# Patient Record
Sex: MALE | Race: WHITE | NOT HISPANIC OR LATINO | Employment: OTHER | URBAN - METROPOLITAN AREA
[De-identification: names, ages, dates, MRNs, and addresses within clinical notes are randomized per-mention and may not be internally consistent; named-entity substitution may affect disease eponyms.]

---

## 2020-09-10 ENCOUNTER — MISSION CATARACT COMP/SO'S (OUTPATIENT)
Dept: URBAN - METROPOLITAN AREA CLINIC 39 | Facility: CLINIC | Age: 65
End: 2020-09-10

## 2020-09-10 DIAGNOSIS — H25.811: ICD-10-CM

## 2020-09-10 PROCEDURE — 99499MCC MISSION CATARACT COMP EYE EXAM / SOS

## 2020-09-10 ASSESSMENT — VISUAL ACUITY
OS_SC: J1
OD_CC: CF 2FT
OS_CC: 20/25+2
OD_SC: >J12
OS_SC: 20/200
OD_SC: CF 1FT

## 2020-09-10 ASSESSMENT — TONOMETRY
OD_IOP_MMHG: 13
OS_IOP_MMHG: 11

## 2020-09-23 ENCOUNTER — MISSION CATARACT SURGERY/PROCEDURE (OUTPATIENT)
Dept: URBAN - METROPOLITAN AREA CLINIC 39 | Facility: CLINIC | Age: 65
End: 2020-09-23

## 2020-09-23 DIAGNOSIS — H25.811: ICD-10-CM

## 2020-09-23 PROCEDURE — 66984MC MISSION CATARACT SURGERY (ALL INCLUSIVE)

## 2020-09-24 ENCOUNTER — CATARACT POST-OP 1-DAY (OUTPATIENT)
Dept: URBAN - METROPOLITAN AREA CLINIC 39 | Facility: CLINIC | Age: 65
End: 2020-09-24

## 2020-09-24 DIAGNOSIS — Z96.1: ICD-10-CM

## 2020-09-24 PROCEDURE — 99024 POSTOP FOLLOW-UP VISIT: CPT

## 2020-09-24 ASSESSMENT — TONOMETRY
OS_IOP_MMHG: 15
OD_IOP_MMHG: 16

## 2020-09-24 ASSESSMENT — VISUAL ACUITY
OD_PH: 20/100
OS_SC: 20/80
OD_SC: 20/200

## 2020-10-26 ENCOUNTER — POST-OP CATARACT (OUTPATIENT)
Dept: URBAN - METROPOLITAN AREA CLINIC 39 | Facility: CLINIC | Age: 65
End: 2020-10-26

## 2020-10-26 DIAGNOSIS — Z96.1: ICD-10-CM

## 2020-10-26 PROCEDURE — 99024 POSTOP FOLLOW-UP VISIT: CPT

## 2020-10-26 ASSESSMENT — VISUAL ACUITY
OU_SC: 20/80
OS_SC: J1+
OD_BAT: 20/80
OS_SC: 20/80
OD_SC: 20/200-1
OU_SC: J1+

## 2020-10-26 ASSESSMENT — TONOMETRY
OD_IOP_MMHG: 14
OS_IOP_MMHG: 14

## 2020-11-10 ENCOUNTER — POST-OP CATARACT (OUTPATIENT)
Dept: URBAN - METROPOLITAN AREA CLINIC 39 | Facility: CLINIC | Age: 65
End: 2020-11-10

## 2020-11-10 DIAGNOSIS — Z96.1: ICD-10-CM

## 2020-11-10 PROCEDURE — 99024 POSTOP FOLLOW-UP VISIT: CPT

## 2020-11-10 ASSESSMENT — KERATOMETRY
OD_K1POWER_DIOPTERS: 43.75
OS_K2POWER_DIOPTERS: 44.25
OS_AXISANGLE_DEGREES: 180
OS_AXISANGLE2_DEGREES: 90
OD_K2POWER_DIOPTERS: 46.25
OS_K1POWER_DIOPTERS: 44.25
OD_AXISANGLE2_DEGREES: 145
OD_AXISANGLE_DEGREES: 55

## 2020-11-10 ASSESSMENT — VISUAL ACUITY
OS_SC: 20/80
OU_SC: 20/80-1
OD_SC: 20/200

## 2020-11-16 NOTE — PATIENT DISCUSSION
"""Phaco with IOL OD: 12/01/2020 Raquel ZCB00 +25.0 Target: [facility] [place]
No

## 2020-12-01 NOTE — PATIENT DISCUSSION
"""S/P IOL OD: Tecnis ZCB00 +25.0 (Target: [place]) +Femto/Arcs +[place]ria.  Continue post operative instructions and drops per schedule. "" ""Increase Pred-Moxi-Nepaf right eye three times a day

## 2021-01-19 NOTE — PATIENT DISCUSSION
"""Discussed dry eye diagnosis with patient.  Educated patient on proper lid hygiene and stressed importance of lid massages and the use of warm compresses and artificial tears."" ""Continue Preservative free tears both eyes Every 2 hours

## 2021-03-22 ENCOUNTER — POST-OP (OUTPATIENT)
Dept: URBAN - METROPOLITAN AREA CLINIC 39 | Facility: CLINIC | Age: 66
End: 2021-03-22

## 2021-03-22 DIAGNOSIS — Z96.1: ICD-10-CM

## 2021-03-22 PROCEDURE — 99024 POSTOP FOLLOW-UP VISIT: CPT

## 2021-03-22 ASSESSMENT — VISUAL ACUITY
OU_CC: J1+
OU_CC: 20/25-1+1
OD_SC: 20/200-1
OD_SC: J6
OU_SC: J1
OS_CC: J1+
OS_CC: 20/25-1
OU_SC: 20/100+1
OS_SC: J1
OS_SC: 20/100
OD_CC: 20/100
OD_CC: J10-

## 2021-03-22 ASSESSMENT — KERATOMETRY
OD_K1POWER_DIOPTERS: 43.50
OD_AXISANGLE_DEGREES: 55
OS_K2POWER_DIOPTERS: 44.25
OS_K1POWER_DIOPTERS: 44.25
OD_K1POWER_DIOPTERS: 43.75
OS_AXISANGLE2_DEGREES: 90
OD_K2POWER_DIOPTERS: 46.25
OS_AXISANGLE_DEGREES: 180
OD_K2POWER_DIOPTERS: 46.50
OD_AXISANGLE2_DEGREES: 145

## 2021-03-22 ASSESSMENT — TONOMETRY
OS_IOP_MMHG: 14
OD_IOP_MMHG: 13

## 2021-04-06 ENCOUNTER — YAG CAPSULOTOMY (OUTPATIENT)
Dept: URBAN - METROPOLITAN AREA SURGERY 14 | Facility: SURGERY | Age: 66
End: 2021-04-06

## 2021-04-06 ENCOUNTER — YAG EVALUATION (OUTPATIENT)
Dept: URBAN - METROPOLITAN AREA SURGERY 14 | Facility: SURGERY | Age: 66
End: 2021-04-06

## 2021-04-06 DIAGNOSIS — H26.491: ICD-10-CM

## 2021-04-06 DIAGNOSIS — Z96.1: ICD-10-CM

## 2021-04-06 PROCEDURE — 66821 AFTER CATARACT LASER SURGERY: CPT

## 2021-04-06 PROCEDURE — 92014 COMPRE OPH EXAM EST PT 1/>: CPT

## 2021-04-06 ASSESSMENT — KERATOMETRY
OD_AXISANGLE_DEGREES: 55
OD_K1POWER_DIOPTERS: 43.50
OD_K1POWER_DIOPTERS: 43.75
OS_AXISANGLE2_DEGREES: 90
OS_K1POWER_DIOPTERS: 44.25
OS_K1POWER_DIOPTERS: 44.25
OD_AXISANGLE2_DEGREES: 145
OS_AXISANGLE2_DEGREES: 90
OD_AXISANGLE_DEGREES: 55
OD_K2POWER_DIOPTERS: 46.50
OD_K2POWER_DIOPTERS: 46.25
OD_K1POWER_DIOPTERS: 43.50
OS_K2POWER_DIOPTERS: 44.25
OS_AXISANGLE_DEGREES: 180
OS_K2POWER_DIOPTERS: 44.25
OD_K2POWER_DIOPTERS: 46.25
OD_K1POWER_DIOPTERS: 43.75
OD_AXISANGLE2_DEGREES: 145
OD_K2POWER_DIOPTERS: 46.50
OS_AXISANGLE_DEGREES: 180

## 2021-04-06 ASSESSMENT — TONOMETRY
OD_IOP_MMHG: 13
OS_IOP_MMHG: 14

## 2021-04-06 ASSESSMENT — VISUAL ACUITY: OD_SC: 20/200

## 2021-04-19 ENCOUNTER — YAG POST-OP (OUTPATIENT)
Dept: URBAN - METROPOLITAN AREA CLINIC 39 | Facility: CLINIC | Age: 66
End: 2021-04-19

## 2021-04-19 DIAGNOSIS — Z98.890: ICD-10-CM

## 2021-04-19 PROCEDURE — 99024 POSTOP FOLLOW-UP VISIT: CPT

## 2021-04-19 ASSESSMENT — KERATOMETRY
OS_AXISANGLE2_DEGREES: 90
OS_AXISANGLE_DEGREES: 180
OD_K2POWER_DIOPTERS: 46.25
OD_K1POWER_DIOPTERS: 43.50
OS_K1POWER_DIOPTERS: 44.25
OD_K1POWER_DIOPTERS: 43.75
OS_K2POWER_DIOPTERS: 44.25
OD_K2POWER_DIOPTERS: 46.50
OD_AXISANGLE_DEGREES: 55
OD_AXISANGLE2_DEGREES: 145

## 2021-04-19 ASSESSMENT — VISUAL ACUITY
OS_SC: J1+
OD_SC: J10
OS_SC: 20/200
OD_SC: 20/100

## 2021-04-19 ASSESSMENT — TONOMETRY
OS_IOP_MMHG: 17
OD_IOP_MMHG: 17

## 2022-04-19 ENCOUNTER — COMPREHENSIVE EXAM (OUTPATIENT)
Dept: URBAN - METROPOLITAN AREA CLINIC 39 | Facility: CLINIC | Age: 67
End: 2022-04-19

## 2022-04-19 DIAGNOSIS — H35.371: ICD-10-CM

## 2022-04-19 DIAGNOSIS — H53.001: ICD-10-CM

## 2022-04-19 DIAGNOSIS — H02.88B: ICD-10-CM

## 2022-04-19 DIAGNOSIS — H02.88A: ICD-10-CM

## 2022-04-19 DIAGNOSIS — H04.123: ICD-10-CM

## 2022-04-19 DIAGNOSIS — S05.11XS: ICD-10-CM

## 2022-04-19 DIAGNOSIS — Z98.890: ICD-10-CM

## 2022-04-19 DIAGNOSIS — H17.9: ICD-10-CM

## 2022-04-19 DIAGNOSIS — H01.02A: ICD-10-CM

## 2022-04-19 DIAGNOSIS — H18.513: ICD-10-CM

## 2022-04-19 DIAGNOSIS — H01.02B: ICD-10-CM

## 2022-04-19 DIAGNOSIS — H25.812: ICD-10-CM

## 2022-04-19 DIAGNOSIS — Z96.1: ICD-10-CM

## 2022-04-19 PROCEDURE — 92134 CPTRZ OPH DX IMG PST SGM RTA: CPT

## 2022-04-19 PROCEDURE — 92015 DETERMINE REFRACTIVE STATE: CPT

## 2022-04-19 PROCEDURE — 92014 COMPRE OPH EXAM EST PT 1/>: CPT

## 2022-04-19 ASSESSMENT — VISUAL ACUITY
OD_SC: <J10
OU_SC: J1+
OU_CC: 20/20-1
OS_SC: 20/200
OD_CC: 20/70-1
OS_SC: J1+
OD_SC: 20/200+2
OS_CC: 20/20
OU_SC: 20/200

## 2022-04-19 ASSESSMENT — KERATOMETRY
OD_AXISANGLE2_DEGREES: 145
OD_K1POWER_DIOPTERS: 43.50
OD_AXISANGLE_DEGREES: 55
OS_K1POWER_DIOPTERS: 44.25
OS_AXISANGLE_DEGREES: 180
OS_K2POWER_DIOPTERS: 44.25
OD_K2POWER_DIOPTERS: 46.25
OD_K1POWER_DIOPTERS: 43.75
OD_K2POWER_DIOPTERS: 46.50
OS_AXISANGLE2_DEGREES: 90

## 2022-04-19 ASSESSMENT — TONOMETRY
OS_IOP_MMHG: 17
OD_IOP_MMHG: 17

## 2023-04-18 ENCOUNTER — COMPREHENSIVE EXAM (OUTPATIENT)
Dept: URBAN - METROPOLITAN AREA CLINIC 39 | Facility: CLINIC | Age: 68
End: 2023-04-18

## 2023-04-18 DIAGNOSIS — H01.02A: ICD-10-CM

## 2023-04-18 DIAGNOSIS — H01.02B: ICD-10-CM

## 2023-04-18 DIAGNOSIS — Z96.1: ICD-10-CM

## 2023-04-18 DIAGNOSIS — H17.9: ICD-10-CM

## 2023-04-18 DIAGNOSIS — H02.88B: ICD-10-CM

## 2023-04-18 DIAGNOSIS — H25.812: ICD-10-CM

## 2023-04-18 DIAGNOSIS — H02.88A: ICD-10-CM

## 2023-04-18 DIAGNOSIS — H53.001: ICD-10-CM

## 2023-04-18 DIAGNOSIS — H18.513: ICD-10-CM

## 2023-04-18 DIAGNOSIS — Z98.890: ICD-10-CM

## 2023-04-18 DIAGNOSIS — H04.123: ICD-10-CM

## 2023-04-18 DIAGNOSIS — H35.371: ICD-10-CM

## 2023-04-18 DIAGNOSIS — S05.11XS: ICD-10-CM

## 2023-04-18 PROCEDURE — 92134 CPTRZ OPH DX IMG PST SGM RTA: CPT

## 2023-04-18 PROCEDURE — 92014 COMPRE OPH EXAM EST PT 1/>: CPT

## 2023-04-18 PROCEDURE — 92015 DETERMINE REFRACTIVE STATE: CPT

## 2023-04-18 ASSESSMENT — TONOMETRY
OS_IOP_MMHG: 21
OD_IOP_MMHG: 18

## 2023-04-18 ASSESSMENT — VISUAL ACUITY
OU_CC: 20/20
OD_SC: J10
OU_SC: J1+
OS_CC: 20/20-1
OD_CC: 20/100-1
OS_SC: J1+

## 2023-04-18 ASSESSMENT — KERATOMETRY
OD_AXISANGLE2_DEGREES: 145
OS_AXISANGLE2_DEGREES: 90
OS_AXISANGLE_DEGREES: 180
OD_K2POWER_DIOPTERS: 46.25
OD_K2POWER_DIOPTERS: 46.50
OD_K1POWER_DIOPTERS: 43.75
OS_K1POWER_DIOPTERS: 44.25
OD_K1POWER_DIOPTERS: 43.50
OS_K2POWER_DIOPTERS: 44.25
OD_AXISANGLE_DEGREES: 55

## 2024-04-23 ENCOUNTER — COMPREHENSIVE EXAM (OUTPATIENT)
Dept: URBAN - METROPOLITAN AREA CLINIC 39 | Facility: CLINIC | Age: 69
End: 2024-04-23

## 2024-04-23 DIAGNOSIS — Z98.890: ICD-10-CM

## 2024-04-23 DIAGNOSIS — H25.812: ICD-10-CM

## 2024-04-23 DIAGNOSIS — Z96.1: ICD-10-CM

## 2024-04-23 DIAGNOSIS — H01.02A: ICD-10-CM

## 2024-04-23 DIAGNOSIS — H02.88B: ICD-10-CM

## 2024-04-23 DIAGNOSIS — H53.001: ICD-10-CM

## 2024-04-23 DIAGNOSIS — H18.513: ICD-10-CM

## 2024-04-23 DIAGNOSIS — S05.11XS: ICD-10-CM

## 2024-04-23 DIAGNOSIS — H17.9: ICD-10-CM

## 2024-04-23 DIAGNOSIS — H04.123: ICD-10-CM

## 2024-04-23 DIAGNOSIS — H35.371: ICD-10-CM

## 2024-04-23 DIAGNOSIS — H02.88A: ICD-10-CM

## 2024-04-23 DIAGNOSIS — H01.02B: ICD-10-CM

## 2024-04-23 PROCEDURE — 92014 COMPRE OPH EXAM EST PT 1/>: CPT

## 2024-04-23 PROCEDURE — 92015 DETERMINE REFRACTIVE STATE: CPT

## 2024-04-23 PROCEDURE — 92134 CPTRZ OPH DX IMG PST SGM RTA: CPT

## 2024-04-23 ASSESSMENT — VISUAL ACUITY
OS_SC: J1+
OU_CC: 20/20
OD_SC: J10
OS_CC: 20/20-1
OD_CC: 20/70-2
OU_SC: J1+

## 2024-04-23 ASSESSMENT — KERATOMETRY
OS_AXISANGLE_DEGREES: 180
OS_K2POWER_DIOPTERS: 44.25
OD_K2POWER_DIOPTERS: 46.25
OD_AXISANGLE_DEGREES: 55
OD_K2POWER_DIOPTERS: 46.50
OS_AXISANGLE2_DEGREES: 90
OD_K1POWER_DIOPTERS: 43.75
OD_K1POWER_DIOPTERS: 43.50
OD_AXISANGLE2_DEGREES: 145
OS_K1POWER_DIOPTERS: 44.25

## 2024-04-23 ASSESSMENT — TONOMETRY
OD_IOP_MMHG: 16
OS_IOP_MMHG: 17

## 2025-04-29 ENCOUNTER — COMPREHENSIVE EXAM (OUTPATIENT)
Age: 70
End: 2025-04-29

## 2025-04-29 DIAGNOSIS — H02.88A: ICD-10-CM

## 2025-04-29 DIAGNOSIS — S05.11XS: ICD-10-CM

## 2025-04-29 DIAGNOSIS — Z98.890: ICD-10-CM

## 2025-04-29 DIAGNOSIS — Z96.1: ICD-10-CM

## 2025-04-29 DIAGNOSIS — H18.513: ICD-10-CM

## 2025-04-29 DIAGNOSIS — H25.812: ICD-10-CM

## 2025-04-29 DIAGNOSIS — H02.88B: ICD-10-CM

## 2025-04-29 DIAGNOSIS — H01.02A: ICD-10-CM

## 2025-04-29 DIAGNOSIS — H04.123: ICD-10-CM

## 2025-04-29 DIAGNOSIS — H53.001: ICD-10-CM

## 2025-04-29 DIAGNOSIS — H01.02B: ICD-10-CM

## 2025-04-29 DIAGNOSIS — H17.9: ICD-10-CM

## 2025-04-29 DIAGNOSIS — H35.371: ICD-10-CM

## 2025-04-29 PROCEDURE — 92015 DETERMINE REFRACTIVE STATE: CPT

## 2025-04-29 PROCEDURE — 92014 COMPRE OPH EXAM EST PT 1/>: CPT

## 2025-04-29 PROCEDURE — 92134 CPTRZ OPH DX IMG PST SGM RTA: CPT
